# Patient Record
Sex: FEMALE | Race: WHITE | NOT HISPANIC OR LATINO
[De-identification: names, ages, dates, MRNs, and addresses within clinical notes are randomized per-mention and may not be internally consistent; named-entity substitution may affect disease eponyms.]

---

## 2020-05-22 ENCOUNTER — TRANSCRIPTION ENCOUNTER (OUTPATIENT)
Age: 80
End: 2020-05-22

## 2022-12-01 ENCOUNTER — OFFICE (OUTPATIENT)
Dept: URBAN - METROPOLITAN AREA CLINIC 8 | Facility: CLINIC | Age: 82
Setting detail: OPHTHALMOLOGY
End: 2022-12-01
Payer: MEDICARE

## 2022-12-01 DIAGNOSIS — H16.223: ICD-10-CM

## 2022-12-01 DIAGNOSIS — H47.291: ICD-10-CM

## 2022-12-01 DIAGNOSIS — H40.1131: ICD-10-CM

## 2022-12-01 DIAGNOSIS — H00.14: ICD-10-CM

## 2022-12-01 PROCEDURE — 99213 OFFICE O/P EST LOW 20 MIN: CPT | Performed by: OPHTHALMOLOGY

## 2022-12-01 PROCEDURE — 92133 CPTRZD OPH DX IMG PST SGM ON: CPT | Performed by: OPHTHALMOLOGY

## 2022-12-01 ASSESSMENT — PACHYMETRY
OS_CT_CORRECTION: -2
OD_CT_CORRECTION: -6
OS_CT_UM: 572
OD_CT_UM: 638

## 2022-12-01 ASSESSMENT — TONOMETRY
OS_IOP_MMHG: 14
OD_IOP_MMHG: 14

## 2022-12-01 ASSESSMENT — REFRACTION_AUTOREFRACTION
OS_SPHERE: +1.00
OS_AXIS: 118
OD_CYLINDER: -0.75
OD_AXIS: 028
OS_CYLINDER: -0.50
OD_SPHERE: +1.25

## 2022-12-01 ASSESSMENT — KERATOMETRY
METHOD_AUTO_MANUAL: AUTO
OD_AXISANGLE_DEGREES: 075
OS_K1POWER_DIOPTERS: 43.50
OS_K2POWER_DIOPTERS: 43.75
OD_K1POWER_DIOPTERS: 41.50
OD_K2POWER_DIOPTERS: 44.00
OS_AXISANGLE_DEGREES: 077

## 2022-12-01 ASSESSMENT — REFRACTION_MANIFEST
OS_AXIS: 120
OS_VA2: 20/50(J5)
OD_VA1: CF
OD_ADD: +2.75
OD_SPHERE: BALANCE
OS_VA1: 20/25
OD_ADD: +2.75
OD_VA1: CF
OS_VA1: 20/25
OS_VA2: 20/50(J5)
OS_CYLINDER: -0.25
OS_CYLINDER: -0.25
OS_SPHERE: +1.00
OS_ADD: +2.75
OD_VA2: 20/50(J5)
OS_SPHERE: +1.00
OS_ADD: +2.75
OD_VA2: 20/50(J5)
OS_AXIS: 120
OD_SPHERE: BALANCE

## 2022-12-01 ASSESSMENT — AXIALLENGTH_DERIVED
OD_AL: 23.5267
OS_AL: 23.2113
OS_AL: 23.2113
OS_AL: 23.2586

## 2022-12-01 ASSESSMENT — SPHEQUIV_DERIVED
OS_SPHEQUIV: 0.75
OS_SPHEQUIV: 0.875
OS_SPHEQUIV: 0.875
OD_SPHEQUIV: 0.875

## 2022-12-01 ASSESSMENT — SUPERFICIAL PUNCTATE KERATITIS (SPK)
OS_SPK: 3+
OD_SPK: 3+

## 2022-12-01 ASSESSMENT — CONFRONTATIONAL VISUAL FIELD TEST (CVF)
OD_FINDINGS: FULL
OS_FINDINGS: FULL

## 2022-12-01 ASSESSMENT — VISUAL ACUITY
OD_BCVA: 20/25-2
OS_BCVA: CF 1FT

## 2023-01-06 ENCOUNTER — OFFICE (OUTPATIENT)
Dept: URBAN - METROPOLITAN AREA CLINIC 8 | Facility: CLINIC | Age: 83
Setting detail: OPHTHALMOLOGY
End: 2023-01-06
Payer: MEDICARE

## 2023-01-06 DIAGNOSIS — H02.824: ICD-10-CM

## 2023-01-06 DIAGNOSIS — H16.223: ICD-10-CM

## 2023-01-06 DIAGNOSIS — H00.14: ICD-10-CM

## 2023-01-06 PROCEDURE — 99213 OFFICE O/P EST LOW 20 MIN: CPT | Performed by: OPHTHALMOLOGY

## 2023-01-06 ASSESSMENT — VISUAL ACUITY
OS_BCVA: CF 1FT
OD_BCVA: 20/25-2

## 2023-01-06 ASSESSMENT — TONOMETRY
OD_IOP_MMHG: 10
OS_IOP_MMHG: 12

## 2023-01-06 ASSESSMENT — REFRACTION_MANIFEST
OS_AXIS: 120
OS_VA1: 20/25
OD_ADD: +2.75
OS_VA2: 20/50(J5)
OS_SPHERE: +1.00
OD_SPHERE: BALANCE
OS_CYLINDER: -0.25
OS_CYLINDER: -0.25
OS_ADD: +2.75
OS_SPHERE: +1.00
OS_VA2: 20/50(J5)
OS_ADD: +2.75
OD_VA1: CF
OD_SPHERE: BALANCE
OS_AXIS: 120
OD_VA2: 20/50(J5)
OS_VA1: 20/25
OD_VA2: 20/50(J5)
OD_VA1: CF
OD_ADD: +2.75

## 2023-01-06 ASSESSMENT — REFRACTION_AUTOREFRACTION
OD_AXIS: 028
OD_SPHERE: +1.25
OD_CYLINDER: -0.75
OS_AXIS: 118
OS_CYLINDER: -0.50
OS_SPHERE: +1.00

## 2023-01-06 ASSESSMENT — PACHYMETRY
OD_CT_UM: 638
OS_CT_UM: 572
OS_CT_CORRECTION: -2
OD_CT_CORRECTION: -6

## 2023-01-06 ASSESSMENT — SPHEQUIV_DERIVED
OS_SPHEQUIV: 0.875
OS_SPHEQUIV: 0.75
OD_SPHEQUIV: 0.875
OS_SPHEQUIV: 0.875

## 2023-01-06 ASSESSMENT — AXIALLENGTH_DERIVED
OS_AL: 23.2586
OS_AL: 23.2113
OS_AL: 23.2113
OD_AL: 23.5267

## 2023-01-06 ASSESSMENT — CONFRONTATIONAL VISUAL FIELD TEST (CVF)
OS_FINDINGS: FULL
OD_FINDINGS: FULL

## 2023-01-06 ASSESSMENT — KERATOMETRY
OD_K1POWER_DIOPTERS: 41.50
METHOD_AUTO_MANUAL: AUTO
OD_AXISANGLE_DEGREES: 075
OS_AXISANGLE_DEGREES: 077
OS_K2POWER_DIOPTERS: 43.75
OD_K2POWER_DIOPTERS: 44.00
OS_K1POWER_DIOPTERS: 43.50

## 2023-01-06 ASSESSMENT — TEAR BREAK UP TIME (TBUT)
OS_TBUT: 2+
OD_TBUT: 2+

## 2023-01-06 ASSESSMENT — SUPERFICIAL PUNCTATE KERATITIS (SPK)
OS_SPK: 3+
OD_SPK: 3+

## 2023-02-01 PROBLEM — Z00.00 ENCOUNTER FOR PREVENTIVE HEALTH EXAMINATION: Status: ACTIVE | Noted: 2023-02-01

## 2023-02-02 ENCOUNTER — NON-APPOINTMENT (OUTPATIENT)
Age: 83
End: 2023-02-02

## 2023-02-02 ENCOUNTER — APPOINTMENT (OUTPATIENT)
Dept: CARDIOLOGY | Facility: CLINIC | Age: 83
End: 2023-02-02
Payer: MEDICARE

## 2023-02-02 VITALS
SYSTOLIC BLOOD PRESSURE: 120 MMHG | DIASTOLIC BLOOD PRESSURE: 62 MMHG | BODY MASS INDEX: 24.79 KG/M2 | WEIGHT: 147 LBS | HEART RATE: 66 BPM | HEIGHT: 64.5 IN | OXYGEN SATURATION: 96 % | TEMPERATURE: 98.2 F | RESPIRATION RATE: 16 BRPM

## 2023-02-02 DIAGNOSIS — M54.2 CERVICALGIA: ICD-10-CM

## 2023-02-02 DIAGNOSIS — Z78.9 OTHER SPECIFIED HEALTH STATUS: ICD-10-CM

## 2023-02-02 DIAGNOSIS — Z82.49 FAMILY HISTORY OF ISCHEMIC HEART DISEASE AND OTHER DISEASES OF THE CIRCULATORY SYSTEM: ICD-10-CM

## 2023-02-02 DIAGNOSIS — Z87.898 PERSONAL HISTORY OF OTHER SPECIFIED CONDITIONS: ICD-10-CM

## 2023-02-02 DIAGNOSIS — Z86.61 PERSONAL HISTORY OF INFECTIONS OF THE CENTRAL NERVOUS SYSTEM: ICD-10-CM

## 2023-02-02 DIAGNOSIS — Z80.3 FAMILY HISTORY OF MALIGNANT NEOPLASM OF BREAST: ICD-10-CM

## 2023-02-02 DIAGNOSIS — Z82.61 FAMILY HISTORY OF ARTHRITIS: ICD-10-CM

## 2023-02-02 DIAGNOSIS — Z87.891 PERSONAL HISTORY OF NICOTINE DEPENDENCE: ICD-10-CM

## 2023-02-02 DIAGNOSIS — Z80.0 FAMILY HISTORY OF MALIGNANT NEOPLASM OF DIGESTIVE ORGANS: ICD-10-CM

## 2023-02-02 DIAGNOSIS — Z92.21 PERSONAL HISTORY OF ANTINEOPLASTIC CHEMOTHERAPY: ICD-10-CM

## 2023-02-02 DIAGNOSIS — M51.9 UNSPECIFIED THORACIC, THORACOLUMBAR AND LUMBOSACRAL INTERVERTEBRAL DISC DISORDER: ICD-10-CM

## 2023-02-02 DIAGNOSIS — Z87.39 PERSONAL HISTORY OF OTHER DISEASES OF THE MUSCULOSKELETAL SYSTEM AND CONNECTIVE TISSUE: ICD-10-CM

## 2023-02-02 DIAGNOSIS — H54.61 UNQUALIFIED VISUAL LOSS, RIGHT EYE, NORMAL VISION LEFT EYE: ICD-10-CM

## 2023-02-02 DIAGNOSIS — Z82.3 FAMILY HISTORY OF STROKE: ICD-10-CM

## 2023-02-02 DIAGNOSIS — Z92.29 PERSONAL HISTORY OF OTHER DRUG THERAPY: ICD-10-CM

## 2023-02-02 PROCEDURE — 99203 OFFICE O/P NEW LOW 30 MIN: CPT

## 2023-02-02 RX ORDER — ASPIRIN 81 MG
81 TABLET, DELAYED RELEASE (ENTERIC COATED) ORAL
Refills: 0 | Status: ACTIVE | COMMUNITY

## 2023-02-02 RX ORDER — LATANOPROST/PF 0.005 %
0.01 DROPS OPHTHALMIC (EYE)
Refills: 0 | Status: ACTIVE | COMMUNITY
Start: 2023-02-02

## 2023-02-02 NOTE — DISCUSSION/SUMMARY
[FreeTextEntry1] : Remarkably healthy 82-year-old female with no previous history of cardiovascular events.  Fairly active for her age without any cardiac symptoms.  She has history of MVP many years ago.  Echocardiogram should be done to evaluate this.  On physical exam, I do not hear any midsystolic click\par \par Baseline EKG is unremarkable with sinus rhythm\par \par ETT should be performed in the future given her age and risk for CAD\par \par She claims that her lipid profile is fairly good.  Will obtain a copy of her last lipid profile from her internist in [place].\par \par Follow-up after above tests\par \par Thank you for this referral and allowing me to participate in the care of this patient.  If I can be of any further help or  if you have any questions, please do not hesitate to contact me\par \par \par Sincerely,\par \par Trey Zuñiga MD, FACC, JAYDEN

## 2023-02-02 NOTE — HISTORY OF PRESENT ILLNESS
[FreeTextEntry1] : Norbert is an 82-year-old female with remote history of MVP.  She has no symptoms of chest discomfort or shortness of breath.  No PND, orthopnea, pedal edema, palpitations or lightheadedness.\par \par Because of her history of MVP, she wanted a checkup.\par \par History of spinal surgery which she recovered from uneventfully.

## 2023-02-03 ENCOUNTER — APPOINTMENT (OUTPATIENT)
Dept: CARDIOLOGY | Facility: CLINIC | Age: 83
End: 2023-02-03
Payer: MEDICARE

## 2023-02-03 PROCEDURE — 93306 TTE W/DOPPLER COMPLETE: CPT

## 2023-02-15 ENCOUNTER — APPOINTMENT (OUTPATIENT)
Dept: CARDIOLOGY | Facility: CLINIC | Age: 83
End: 2023-02-15
Payer: MEDICARE

## 2023-02-15 PROCEDURE — 99214 OFFICE O/P EST MOD 30 MIN: CPT | Mod: 25

## 2023-02-15 PROCEDURE — 93015 CV STRESS TEST SUPVJ I&R: CPT

## 2023-02-15 NOTE — CARDIOLOGY SUMMARY
[de-identified] : \par EKG 2/2/2023 SR  [de-identified] : ETT 2/15/2023 [facility] protocol [de-identified] : \par Echo 2/3/2023 EF55-60%, mild MR

## 2023-02-15 NOTE — DISCUSSION/SUMMARY
[FreeTextEntry1] : ROSELINE ALVAREZ  is a 82 year F  who presents today Feb 15, 2023 with the above history and the following active issues.\par \par Baseline EKG is unremarkable with sinus rhythm\par \par Echo with normal resting cardiac structure and function.\par ETT with no significant EKG changes with exercise.\par Continue to remain active with cardiovascular exercise.\par CT calcium score for risk stratification - I will review the results over the phone with the patient.\par For now she will continue ASA and statin.\par Follow-up labs including fasting lipid panel.\par \par Mild mitral regurgitation. Continue to monitor\par Does not require SBE prophylaxis \par HR and blood pressure well controlled.\par \par Red flag symptoms which would warrant sooner emergent evaluation reviewed with the patient. \par Questions and concerns were addressed and answered. \par Limitations of non-invasive testing reviewed\par \par Sincerely,\par \par Saloni Holman PA-C\par Patients history, testing and plan reviewed with supervising MD: Dr. Tamia Hall

## 2023-02-15 NOTE — HISTORY OF PRESENT ILLNESS
[FreeTextEntry1] : ROSELINE ALVAREZ  is a 82 year F  who presents today Feb 15, 2023 in clinical follow-up and to review recent cardiovascular testing. \desiree Toussaint is an 82-year-old female with remote history of MVP. \par Overall she has been feeling well. There has been no recent illness or hospital stay. Medications have remained unchanged. Asymptomatic from cardiovascular and arrhythmia standpoint. \par She has remained very active with no new exertional complaints.\par Previously followed by cardiologist in [place].\par Taking ASA and Crestor for many years.\par \par Today she denies chest pain, pressure, unusual shortness of breath, lightheadedness, dizziness, near syncope or syncope. \par

## 2023-03-02 ENCOUNTER — OFFICE (OUTPATIENT)
Dept: URBAN - METROPOLITAN AREA CLINIC 8 | Facility: CLINIC | Age: 83
Setting detail: OPHTHALMOLOGY
End: 2023-03-02
Payer: MEDICARE

## 2023-03-02 DIAGNOSIS — H00.14: ICD-10-CM

## 2023-03-02 DIAGNOSIS — H16.223: ICD-10-CM

## 2023-03-02 PROCEDURE — 99213 OFFICE O/P EST LOW 20 MIN: CPT | Performed by: OPHTHALMOLOGY

## 2023-03-02 ASSESSMENT — REFRACTION_MANIFEST
OD_VA2: 20/50(J5)
OS_AXIS: 120
OD_ADD: +2.75
OS_VA2: 20/50(J5)
OS_VA1: 20/25
OD_SPHERE: BALANCE
OS_SPHERE: +1.00
OS_AXIS: 120
OD_SPHERE: BALANCE
OS_VA2: 20/50(J5)
OS_CYLINDER: -0.25
OD_VA2: 20/50(J5)
OS_ADD: +2.75
OS_SPHERE: +1.00
OS_VA1: 20/25
OS_ADD: +2.75
OD_ADD: +2.75
OD_VA1: CF
OS_CYLINDER: -0.25
OD_VA1: CF

## 2023-03-02 ASSESSMENT — VISUAL ACUITY
OD_BCVA: 20/25-2
OS_BCVA: CF 1FT

## 2023-03-02 ASSESSMENT — AXIALLENGTH_DERIVED
OS_AL: 23.2586
OD_AL: 23.5267
OS_AL: 23.2113
OS_AL: 23.2113

## 2023-03-02 ASSESSMENT — REFRACTION_AUTOREFRACTION
OS_SPHERE: +1.00
OD_AXIS: 028
OS_AXIS: 118
OD_CYLINDER: -0.75
OS_CYLINDER: -0.50
OD_SPHERE: +1.25

## 2023-03-02 ASSESSMENT — KERATOMETRY
OD_K2POWER_DIOPTERS: 44.00
OS_K1POWER_DIOPTERS: 43.50
OD_AXISANGLE_DEGREES: 075
OS_K2POWER_DIOPTERS: 43.75
METHOD_AUTO_MANUAL: AUTO
OS_AXISANGLE_DEGREES: 077
OD_K1POWER_DIOPTERS: 41.50

## 2023-03-02 ASSESSMENT — SPHEQUIV_DERIVED
OS_SPHEQUIV: 0.875
OS_SPHEQUIV: 0.75
OD_SPHEQUIV: 0.875
OS_SPHEQUIV: 0.875

## 2023-03-02 ASSESSMENT — CONFRONTATIONAL VISUAL FIELD TEST (CVF)
OS_FINDINGS: FULL
OD_FINDINGS: FULL

## 2023-03-02 ASSESSMENT — SUPERFICIAL PUNCTATE KERATITIS (SPK)
OS_SPK: 3+
OD_SPK: 3+

## 2023-03-02 ASSESSMENT — TEAR BREAK UP TIME (TBUT)
OS_TBUT: 2+
OD_TBUT: 2+

## 2023-03-24 ENCOUNTER — APPOINTMENT (OUTPATIENT)
Dept: CARDIOLOGY | Facility: CLINIC | Age: 83
End: 2023-03-24
Payer: MEDICARE

## 2023-03-24 VITALS
WEIGHT: 145 LBS | BODY MASS INDEX: 24.75 KG/M2 | HEIGHT: 64 IN | DIASTOLIC BLOOD PRESSURE: 80 MMHG | SYSTOLIC BLOOD PRESSURE: 124 MMHG | HEART RATE: 69 BPM | OXYGEN SATURATION: 96 %

## 2023-03-24 DIAGNOSIS — R73.03 PREDIABETES.: ICD-10-CM

## 2023-03-24 DIAGNOSIS — I34.0 NONRHEUMATIC MITRAL (VALVE) INSUFFICIENCY: ICD-10-CM

## 2023-03-24 PROCEDURE — 99214 OFFICE O/P EST MOD 30 MIN: CPT

## 2023-03-24 NOTE — HISTORY OF PRESENT ILLNESS
[FreeTextEntry1] : ROSELINE ALVAREZ  is a 82 year F  who presents today  in clinical follow-up and to review recent cardiovascular testing. \par \par Overall she has been feeling well. There has been no recent illness or hospital stay. Medications have remained unchanged. Asymptomatic from cardiovascular and arrhythmia standpoint. \par She has remained very active with no new exertional complaints.\par Previously followed by cardiologist in [place].\par Taking ASA and Crestor for many years.\par \par Today she denies chest pain, pressure, unusual shortness of breath, lightheadedness, dizziness, near syncope or syncope. \par \par Medical history includes\par Mitral valve prolapse/mitral regurgitation\par Osteoarthritis with back surgery

## 2023-03-24 NOTE — PHYSICAL EXAM
[Normal] : clear lung fields, good air entry, no respiratory distress [No Edema] : no edema [Normal Radial B/L] : normal radial B/L [Moves all extremities] : moves all extremities

## 2023-03-24 NOTE — ASSESSMENT
[FreeTextEntry1] : Reviewed on March 24, 2023.\par TTE/exercise treadmill stress test/labs were reviewed.\par Cardiac CT scan as noted above reviewed.\par CBC stable CMP stable hemoglobin A1c 6.0 LDL 90 HDL 85 triglycerides 101 total cholesterol 200 TSH 2.0

## 2023-03-24 NOTE — CARDIOLOGY SUMMARY
[de-identified] : \par EKG 2/2/2023 SR  [de-identified] : ETT 2/15/2023 [facility] protocol [de-identified] : \par Echo 2/3/2023 EF55-60%, mild MR [de-identified] : Coronary calcium score reviewed from February 23, 2023.  CCS score of 154.  Mainly involving left main.

## 2023-03-24 NOTE — DISCUSSION/SUMMARY
[FreeTextEntry1] : ROSELINE ALVAREZ  is a 82 year F  who presents today  with the above history and the following active issues.\par \par Calcific coronary atherosclerosis.  Nonischemic stress test.  No signs of unstable CAD.  Reviewed with her regarding pathophysiology of atherosclerosis.  Need for continue lifestyle and risk factor modifications.  Risk benefits alternatives of aspirin and statin therapy reviewed.  Probably will need higher dose of statins.  She would like to continue with the lifestyle and present dose of statins with repeat labs in about 6 months.  If LDL cholesterol remains greater than 60 consider increasing dose of statins.\par \par Echo with normal resting cardiac structure and function.\par ETT with no significant EKG changes with exercise.\par Continue to remain active with cardiovascular exercise.\par For now she will continue ASA and statin.\par Follow-up labs including fasting lipid panel.\par \par Mild mitral regurgitation. Continue to monitor\par Does not require SBE prophylaxis \par HR and blood pressure well controlled.\par \par Counseling regarding low saturated fat, salt and carbohydrate intake was reviewed. Active lifestyle and regular. Exercise along with weight management is advised.\par All the above were at length reviewed. Answered all the questions. Thank you very much for this kind referral. Please do not hesitate to give me a call for any question.\par Part of this transcription was done with voice recognition software and phonetically similar errors are common. I apologize for that. Please do not hesitate to call for any questions due to above.\par \par Sincerely,\par Jordon Schultz MD,FACC,JAYDEN\par

## 2023-04-21 ENCOUNTER — NON-APPOINTMENT (OUTPATIENT)
Age: 83
End: 2023-04-21

## 2023-04-26 ENCOUNTER — NON-APPOINTMENT (OUTPATIENT)
Age: 83
End: 2023-04-26

## 2023-08-03 ENCOUNTER — OFFICE (OUTPATIENT)
Dept: URBAN - METROPOLITAN AREA CLINIC 8 | Facility: CLINIC | Age: 83
Setting detail: OPHTHALMOLOGY
End: 2023-08-03
Payer: MEDICARE

## 2023-08-03 DIAGNOSIS — H26.491: ICD-10-CM

## 2023-08-03 DIAGNOSIS — H47.291: ICD-10-CM

## 2023-08-03 DIAGNOSIS — Z96.1: ICD-10-CM

## 2023-08-03 DIAGNOSIS — H16.223: ICD-10-CM

## 2023-08-03 DIAGNOSIS — H40.1131: ICD-10-CM

## 2023-08-03 PROCEDURE — 92083 EXTENDED VISUAL FIELD XM: CPT | Performed by: OPHTHALMOLOGY

## 2023-08-03 PROCEDURE — 92133 CPTRZD OPH DX IMG PST SGM ON: CPT | Performed by: OPHTHALMOLOGY

## 2023-08-03 PROCEDURE — 99213 OFFICE O/P EST LOW 20 MIN: CPT | Performed by: OPHTHALMOLOGY

## 2023-08-03 ASSESSMENT — REFRACTION_MANIFEST
OD_VA1: CF
OS_SPHERE: +1.00
OD_SPHERE: BALANCE
OD_ADD: +2.75
OS_SPHERE: +1.00
OD_VA1: CF
OS_CYLINDER: -0.25
OS_AXIS: 120
OS_VA1: 20/25
OS_VA1: 20/25
OD_VA2: 20/50(J5)
OS_AXIS: 120
OD_SPHERE: BALANCE
OS_VA2: 20/50(J5)
OS_ADD: +2.75
OD_VA2: 20/50(J5)
OD_ADD: +2.75
OS_CYLINDER: -0.25
OS_ADD: +2.75
OS_VA2: 20/50(J5)

## 2023-08-03 ASSESSMENT — REFRACTION_AUTOREFRACTION
OS_SPHERE: +1.00
OD_CYLINDER: -0.75
OD_SPHERE: +1.25
OD_AXIS: 028
OS_CYLINDER: -0.50
OS_AXIS: 118

## 2023-08-03 ASSESSMENT — SUPERFICIAL PUNCTATE KERATITIS (SPK)
OD_SPK: 3+
OS_SPK: 3+

## 2023-08-03 ASSESSMENT — AXIALLENGTH_DERIVED
OS_AL: 23.2113
OD_AL: 23.5267
OS_AL: 23.2586
OS_AL: 23.2113

## 2023-08-03 ASSESSMENT — KERATOMETRY
OD_K1POWER_DIOPTERS: 41.50
OD_K2POWER_DIOPTERS: 44.00
OD_AXISANGLE_DEGREES: 075
OS_K2POWER_DIOPTERS: 43.75
METHOD_AUTO_MANUAL: AUTO
OS_K1POWER_DIOPTERS: 43.50
OS_AXISANGLE_DEGREES: 077

## 2023-08-03 ASSESSMENT — TONOMETRY
OS_IOP_MMHG: 16
OD_IOP_MMHG: 15

## 2023-08-03 ASSESSMENT — PACHYMETRY
OS_CT_UM: 572
OS_CT_CORRECTION: -2
OD_CT_UM: 638
OD_CT_CORRECTION: -6

## 2023-08-03 ASSESSMENT — VISUAL ACUITY
OD_BCVA: 20/25
OS_BCVA: CF 1FT

## 2023-08-03 ASSESSMENT — SPHEQUIV_DERIVED
OS_SPHEQUIV: 0.875
OS_SPHEQUIV: 0.875
OD_SPHEQUIV: 0.875
OS_SPHEQUIV: 0.75

## 2023-08-03 ASSESSMENT — CONFRONTATIONAL VISUAL FIELD TEST (CVF)
OS_FINDINGS: FULL
OD_FINDINGS: FULL

## 2023-08-03 ASSESSMENT — TEAR BREAK UP TIME (TBUT)
OS_TBUT: 2+
OD_TBUT: 2+

## 2023-09-21 ENCOUNTER — APPOINTMENT (OUTPATIENT)
Dept: CARDIOLOGY | Facility: CLINIC | Age: 83
End: 2023-09-21
Payer: MEDICARE

## 2023-09-21 VITALS
HEIGHT: 64 IN | WEIGHT: 144 LBS | BODY MASS INDEX: 24.59 KG/M2 | SYSTOLIC BLOOD PRESSURE: 128 MMHG | HEART RATE: 65 BPM | OXYGEN SATURATION: 96 % | DIASTOLIC BLOOD PRESSURE: 62 MMHG

## 2023-09-21 DIAGNOSIS — I34.1 NONRHEUMATIC MITRAL (VALVE) PROLAPSE: ICD-10-CM

## 2023-09-21 PROCEDURE — 99214 OFFICE O/P EST MOD 30 MIN: CPT

## 2024-01-12 ENCOUNTER — APPOINTMENT (OUTPATIENT)
Dept: CARDIOLOGY | Facility: CLINIC | Age: 84
End: 2024-01-12
Payer: MEDICARE

## 2024-01-12 PROCEDURE — 93306 TTE W/DOPPLER COMPLETE: CPT

## 2024-01-12 PROCEDURE — 93356 MYOCRD STRAIN IMG SPCKL TRCK: CPT

## 2024-04-02 ENCOUNTER — APPOINTMENT (OUTPATIENT)
Dept: CARDIOLOGY | Facility: CLINIC | Age: 84
End: 2024-04-02
Payer: MEDICARE

## 2024-04-02 VITALS
OXYGEN SATURATION: 99 % | HEIGHT: 64 IN | HEART RATE: 61 BPM | BODY MASS INDEX: 24.75 KG/M2 | SYSTOLIC BLOOD PRESSURE: 120 MMHG | DIASTOLIC BLOOD PRESSURE: 64 MMHG | WEIGHT: 145 LBS

## 2024-04-02 DIAGNOSIS — I25.10 ATHEROSCLEROTIC HEART DISEASE OF NATIVE CORONARY ARTERY W/OUT ANGINA PECTORIS: ICD-10-CM

## 2024-04-02 DIAGNOSIS — Z91.89 OTHER SPECIFIED PERSONAL RISK FACTORS, NOT ELSEWHERE CLASSIFIED: ICD-10-CM

## 2024-04-02 DIAGNOSIS — I34.9 NONRHEUMATIC MITRAL VALVE DISORDER, UNSPECIFIED: ICD-10-CM

## 2024-04-02 DIAGNOSIS — E78.49 OTHER HYPERLIPIDEMIA: ICD-10-CM

## 2024-04-02 DIAGNOSIS — E78.5 HYPERLIPIDEMIA, UNSPECIFIED: ICD-10-CM

## 2024-04-02 PROCEDURE — 93356 MYOCRD STRAIN IMG SPCKL TRCK: CPT

## 2024-04-02 PROCEDURE — G2211 COMPLEX E/M VISIT ADD ON: CPT

## 2024-04-02 PROCEDURE — 93308 TTE F-UP OR LMTD: CPT

## 2024-04-02 PROCEDURE — 99214 OFFICE O/P EST MOD 30 MIN: CPT

## 2024-04-02 RX ORDER — ROSUVASTATIN CALCIUM 10 MG/1
10 TABLET, FILM COATED ORAL DAILY
Qty: 90 | Refills: 2 | Status: ACTIVE | COMMUNITY
Start: 1900-01-01 | End: 1900-01-01

## 2024-04-02 NOTE — DISCUSSION/SUMMARY
[FreeTextEntry1] : ROSELINE ALVAREZ  is a 83 year F  who presents today  with the above history and the following active issues.    At risk for cardiomyopathy in presence of immunotherapy, hormone replacement, recent radiation to left chest, status post left breast lumpectomy.  Echocardiogram stable after 3 months with EF as well as GLS.  No clinical signs of left or right heart failure.  BNP level ordered.  Follow-up as recommended by oncologist in 3 months   Calcific coronary atherosclerosis.  Nonischemic stress test.  No signs of unstable CAD.  Reviewed with her regarding pathophysiology of atherosclerosis.  Need for continue lifestyle and risk factor modifications.  Risk benefits alternatives of aspirin and statin therapy reviewed.  Continue rosuvastatin at 5 mg.   If LDL cholesterol remains greater than 70 consider increasing dose of statins as long as no contraindication  Echo with normal resting cardiac structure and function. ETT with no significant EKG changes with exercise. Continue to remain active with cardiovascular exercise. For now she will continue ASA and statin. Follow-up labs including fasting lipid panel.  aortic root dilation: stable. follow. no intervention needed.   Mild mitral regurgitation. Continue to monitor Does not require SBE prophylaxis  HR and blood pressure well controlled.  Counseling regarding low saturated fat, salt and carbohydrate intake was reviewed. Active lifestyle and regular. Exercise along with weight management is advised. All the above were at length reviewed. Answered all the questions. Thank you very much for this kind referral. Please do not hesitate to give me a call for any question. Part of this transcription was done with voice recognition software and phonetically similar errors are common. I apologize for that. Please do not hesitate to call for any questions due to above.  Sincerely, Jordon Schultz MD,FACC,JAYDEN

## 2024-04-02 NOTE — CARDIOLOGY SUMMARY
[de-identified] : Echo 2/3/2023 EF55-60%, mild MR January 2024.  EF 59%.  GLS -20 aortic root dilation April 2, 2024.  EF around 60%.  GLS -20.  Borderline aortic root dimension. [de-identified] : ETT 2/15/2023 [facility] protocol [de-identified] : \par  EKG 2/2/2023 SR  [de-identified] : Coronary calcium score reviewed from February 23, 2023.  CCS score of 154.  Mainly involving left main.

## 2024-04-02 NOTE — ASSESSMENT
[FreeTextEntry1] : Reviewed on March 24, 2023. TTE/exercise treadmill stress test/labs were reviewed. Cardiac CT scan as noted above reviewed. CBC stable CMP stable hemoglobin A1c 6.0 LDL 90 HDL 85 triglycerides 101 total cholesterol 200 TSH 2.0  Reviewed on September 21, 2023. Last cardiovascular tests at her request were again reviewed.  Reviewed on April 2, 2024. Echocardiogram and recent available labs were reviewed with her.

## 2024-04-02 NOTE — PHYSICAL EXAM
[Normal Speech] : normal speech [Alert and Oriented] : alert and oriented [de-identified] : Systolic ejection murmur

## 2024-04-02 NOTE — HISTORY OF PRESENT ILLNESS
[FreeTextEntry1] : ROSELINE ALVAREZ  is a 83 year F  who presents today  in clinical follow-up and to review recent cardiovascular testing.   She is diagnosed to have left breast cancer.  Status postlumpectomy.  S/p radiation.  Now on Herceptin and estrogen.  Needs surveillance LV ejection fraction evaluation in presence of at risk for cardiomyopathy on a 3 monthly basis as per oncology.  Since last seen she has no significant cardiovascular symptoms.  She is taking her Crestor only at 5 mg.  Today she denies chest pain, pressure, unusual shortness of breath, lightheadedness, dizziness, near syncope or syncope.   Medical history includes  Calcific coronary atherosclerotic vascular disease. dyslipidemia  Mitral valve prolapse/mitral regurgitation Osteoarthritis with back surgery

## 2024-05-22 ENCOUNTER — RX RENEWAL (OUTPATIENT)
Age: 84
End: 2024-05-22

## 2024-05-22 RX ORDER — ROSUVASTATIN CALCIUM 5 MG/1
5 TABLET, FILM COATED ORAL
Qty: 90 | Refills: 0 | Status: ACTIVE | COMMUNITY
Start: 2024-05-22 | End: 1900-01-01

## 2024-06-27 ENCOUNTER — APPOINTMENT (OUTPATIENT)
Dept: CARDIOLOGY | Facility: CLINIC | Age: 84
End: 2024-06-27
Payer: MEDICARE

## 2024-06-27 PROCEDURE — 93356 MYOCRD STRAIN IMG SPCKL TRCK: CPT

## 2024-06-27 PROCEDURE — 93321 DOPPLER ECHO F-UP/LMTD STD: CPT

## 2024-06-27 PROCEDURE — 93325 DOPPLER ECHO COLOR FLOW MAPG: CPT

## 2024-06-27 PROCEDURE — 93308 TTE F-UP OR LMTD: CPT

## 2024-07-08 ENCOUNTER — NON-APPOINTMENT (OUTPATIENT)
Age: 84
End: 2024-07-08

## 2024-10-04 ENCOUNTER — APPOINTMENT (OUTPATIENT)
Dept: CARDIOLOGY | Facility: CLINIC | Age: 84
End: 2024-10-04
Payer: MEDICARE

## 2024-10-04 PROCEDURE — 93308 TTE F-UP OR LMTD: CPT

## 2024-10-04 PROCEDURE — 76376 3D RENDER W/INTRP POSTPROCES: CPT

## 2024-10-04 PROCEDURE — 93356 MYOCRD STRAIN IMG SPCKL TRCK: CPT

## 2024-10-08 ENCOUNTER — NON-APPOINTMENT (OUTPATIENT)
Age: 84
End: 2024-10-08

## 2024-10-08 ENCOUNTER — APPOINTMENT (OUTPATIENT)
Dept: CARDIOLOGY | Facility: CLINIC | Age: 84
End: 2024-10-08
Payer: MEDICARE

## 2024-10-08 VITALS
OXYGEN SATURATION: 98 % | DIASTOLIC BLOOD PRESSURE: 72 MMHG | WEIGHT: 139 LBS | SYSTOLIC BLOOD PRESSURE: 104 MMHG | BODY MASS INDEX: 23.86 KG/M2 | HEART RATE: 66 BPM

## 2024-10-08 DIAGNOSIS — I34.9 NONRHEUMATIC MITRAL VALVE DISORDER, UNSPECIFIED: ICD-10-CM

## 2024-10-08 DIAGNOSIS — I25.10 ATHEROSCLEROTIC HEART DISEASE OF NATIVE CORONARY ARTERY W/OUT ANGINA PECTORIS: ICD-10-CM

## 2024-10-08 DIAGNOSIS — E78.5 HYPERLIPIDEMIA, UNSPECIFIED: ICD-10-CM

## 2024-10-08 DIAGNOSIS — I34.1 NONRHEUMATIC MITRAL (VALVE) PROLAPSE: ICD-10-CM

## 2024-10-08 DIAGNOSIS — Z91.89 OTHER SPECIFIED PERSONAL RISK FACTORS, NOT ELSEWHERE CLASSIFIED: ICD-10-CM

## 2024-10-08 PROCEDURE — 99214 OFFICE O/P EST MOD 30 MIN: CPT

## 2024-10-08 PROCEDURE — 93000 ELECTROCARDIOGRAM COMPLETE: CPT

## 2024-10-08 PROCEDURE — G2211 COMPLEX E/M VISIT ADD ON: CPT

## 2024-10-08 RX ORDER — LETROZOLE TABLETS 2.5 MG/1
2.5 TABLET, FILM COATED ORAL DAILY
Refills: 0 | Status: ACTIVE | COMMUNITY

## 2024-10-08 RX ORDER — AMOXICILLIN 500 MG/1
500 TABLET, FILM COATED ORAL
Refills: 0 | Status: ACTIVE | COMMUNITY

## 2025-01-02 ENCOUNTER — RX ONLY (RX ONLY)
Age: 85
End: 2025-01-02

## 2025-01-02 RX ORDER — ROSUVASTATIN 5 MG/1
TABLET, FILM COATED ORAL
Qty: 30 | Refills: 0 | Status: ACTIVE | COMMUNITY

## 2025-01-07 ENCOUNTER — APPOINTMENT (OUTPATIENT)
Dept: CARDIOLOGY | Facility: CLINIC | Age: 85
End: 2025-01-07

## 2025-01-15 ENCOUNTER — OFFICE (OUTPATIENT)
Dept: URBAN - METROPOLITAN AREA CLINIC 97 | Facility: CLINIC | Age: 85
Setting detail: OPHTHALMOLOGY
End: 2025-01-15
Payer: MEDICARE

## 2025-01-15 DIAGNOSIS — H40.1131: ICD-10-CM

## 2025-01-15 DIAGNOSIS — H47.291: ICD-10-CM

## 2025-01-15 DIAGNOSIS — H26.491: ICD-10-CM

## 2025-01-15 DIAGNOSIS — H16.223: ICD-10-CM

## 2025-01-15 DIAGNOSIS — H52.4: ICD-10-CM

## 2025-01-15 PROCEDURE — 92133 CPTRZD OPH DX IMG PST SGM ON: CPT | Performed by: OPTOMETRIST

## 2025-01-15 PROCEDURE — 92014 COMPRE OPH EXAM EST PT 1/>: CPT | Performed by: OPTOMETRIST

## 2025-01-15 PROCEDURE — 92015 DETERMINE REFRACTIVE STATE: CPT | Performed by: OPTOMETRIST

## 2025-01-15 ASSESSMENT — REFRACTION_MANIFEST
OS_SPHERE: +0.50
OD_VA1: 20/HM
OS_ADD: +2.75
OS_VA1: 20/25
OS_CYLINDER: +0.25
OS_SPHERE: +1.00
OD_ADD: +2.75
OD_VA1: CF
OS_VA2: 20/50(J5)
OD_VA2: 20/50(J5)
OD_ADD: +2.75
OD_SPHERE: BALANCE
OD_SPHERE: BALANCE
OS_AXIS: 035
OS_CYLINDER: -0.25
OS_ADD: +2.75
OS_VA2: 20/50(J5)
OD_VA2: 20/50(J5)
OS_AXIS: 120
OS_VA1: 20/30

## 2025-01-15 ASSESSMENT — REFRACTION_CURRENTRX
OS_CYLINDER: +0.25
OS_AXIS: 061
OD_SPHERE: +0.50
OS_ADD: +2.75
OD_AXIS: 132
OS_OVR_VA: 20/
OD_OVR_VA: 20/
OD_ADD: +2.75
OD_CYLINDER: +0.50
OS_SPHERE: +0.75

## 2025-01-15 ASSESSMENT — VISUAL ACUITY
OD_BCVA: 20/30
OS_BCVA: HM

## 2025-01-15 ASSESSMENT — TEAR BREAK UP TIME (TBUT)
OD_TBUT: T 1+
OS_TBUT: T 1+

## 2025-01-15 ASSESSMENT — PACHYMETRY
OS_CT_UM: 572
OD_CT_UM: 638
OD_CT_CORRECTION: -6
OS_CT_CORRECTION: -2

## 2025-01-15 ASSESSMENT — KERATOMETRY
METHOD_AUTO_MANUAL: AUTO
OS_K2POWER_DIOPTERS: 43.50
OD_AXISANGLE_DEGREES: 092
OS_K1POWER_DIOPTERS: 43.25
OD_K2POWER_DIOPTERS: 45.25
OD_K1POWER_DIOPTERS: 42.50
OS_AXISANGLE_DEGREES: 035

## 2025-01-15 ASSESSMENT — REFRACTION_AUTOREFRACTION
OS_AXIS: 037
OD_SPHERE: +0.50
OD_CYLINDER: +1.50
OS_SPHERE: +0.75
OD_AXIS: 103
OS_CYLINDER: +0.50

## 2025-01-15 ASSESSMENT — CONFRONTATIONAL VISUAL FIELD TEST (CVF)
OD_FINDINGS: FULL
OS_FINDINGS: FULL

## 2025-01-29 ENCOUNTER — NON-APPOINTMENT (OUTPATIENT)
Age: 85
End: 2025-01-29

## 2025-01-29 ENCOUNTER — APPOINTMENT (OUTPATIENT)
Dept: CARDIOLOGY | Facility: CLINIC | Age: 85
End: 2025-01-29
Payer: MEDICARE

## 2025-01-29 VITALS
WEIGHT: 141 LBS | HEART RATE: 67 BPM | HEIGHT: 64 IN | BODY MASS INDEX: 24.07 KG/M2 | SYSTOLIC BLOOD PRESSURE: 134 MMHG | OXYGEN SATURATION: 97 % | DIASTOLIC BLOOD PRESSURE: 76 MMHG

## 2025-01-29 DIAGNOSIS — E78.5 HYPERLIPIDEMIA, UNSPECIFIED: ICD-10-CM

## 2025-01-29 DIAGNOSIS — E78.49 OTHER HYPERLIPIDEMIA: ICD-10-CM

## 2025-01-29 DIAGNOSIS — Z91.89 OTHER SPECIFIED PERSONAL RISK FACTORS, NOT ELSEWHERE CLASSIFIED: ICD-10-CM

## 2025-01-29 DIAGNOSIS — I25.10 ATHEROSCLEROTIC HEART DISEASE OF NATIVE CORONARY ARTERY W/OUT ANGINA PECTORIS: ICD-10-CM

## 2025-01-29 PROCEDURE — 93356 MYOCRD STRAIN IMG SPCKL TRCK: CPT

## 2025-01-29 PROCEDURE — 93325 DOPPLER ECHO COLOR FLOW MAPG: CPT

## 2025-01-29 PROCEDURE — 93321 DOPPLER ECHO F-UP/LMTD STD: CPT

## 2025-01-29 PROCEDURE — 99214 OFFICE O/P EST MOD 30 MIN: CPT

## 2025-01-29 PROCEDURE — 93308 TTE F-UP OR LMTD: CPT

## 2025-07-29 ENCOUNTER — OFFICE (OUTPATIENT)
Dept: URBAN - METROPOLITAN AREA CLINIC 97 | Facility: CLINIC | Age: 85
Setting detail: OPHTHALMOLOGY
End: 2025-07-29
Payer: MEDICARE

## 2025-07-29 ENCOUNTER — APPOINTMENT (OUTPATIENT)
Dept: CARDIOLOGY | Facility: CLINIC | Age: 85
End: 2025-07-29
Payer: MEDICARE

## 2025-07-29 VITALS
SYSTOLIC BLOOD PRESSURE: 120 MMHG | WEIGHT: 136 LBS | BODY MASS INDEX: 23.22 KG/M2 | HEART RATE: 68 BPM | HEIGHT: 64 IN | DIASTOLIC BLOOD PRESSURE: 70 MMHG | OXYGEN SATURATION: 98 %

## 2025-07-29 DIAGNOSIS — H40.1131: ICD-10-CM

## 2025-07-29 DIAGNOSIS — H26.491: ICD-10-CM

## 2025-07-29 DIAGNOSIS — H52.4: ICD-10-CM

## 2025-07-29 DIAGNOSIS — H16.223: ICD-10-CM

## 2025-07-29 DIAGNOSIS — I34.0 NONRHEUMATIC MITRAL (VALVE) INSUFFICIENCY: ICD-10-CM

## 2025-07-29 DIAGNOSIS — I25.10 ATHEROSCLEROTIC HEART DISEASE OF NATIVE CORONARY ARTERY W/OUT ANGINA PECTORIS: ICD-10-CM

## 2025-07-29 DIAGNOSIS — E78.49 OTHER HYPERLIPIDEMIA: ICD-10-CM

## 2025-07-29 DIAGNOSIS — E78.5 HYPERLIPIDEMIA, UNSPECIFIED: ICD-10-CM

## 2025-07-29 DIAGNOSIS — H47.291: ICD-10-CM

## 2025-07-29 PROCEDURE — G2211 COMPLEX E/M VISIT ADD ON: CPT

## 2025-07-29 PROCEDURE — 99213 OFFICE O/P EST LOW 20 MIN: CPT | Performed by: OPTOMETRIST

## 2025-07-29 PROCEDURE — 99214 OFFICE O/P EST MOD 30 MIN: CPT

## 2025-07-29 PROCEDURE — 92015 DETERMINE REFRACTIVE STATE: CPT | Performed by: OPTOMETRIST

## 2025-07-29 PROCEDURE — 93000 ELECTROCARDIOGRAM COMPLETE: CPT

## 2025-07-29 ASSESSMENT — REFRACTION_CURRENTRX
OS_OVR_VA: 20/
OD_CYLINDER: +1.25
OD_OVR_VA: 20/
OD_OVR_VA: 20/
OS_OVR_VA: 20/
OD_SPHERE: +2.75
OD_SPHERE: +1.25
OD_AXIS: 117
OS_AXIS: 040
OS_SPHERE: +2.75
OS_SPHERE: +2.00
OS_CYLINDER: +1.00

## 2025-07-29 ASSESSMENT — REFRACTION_MANIFEST
OS_SPHERE: +0.50
OD_ADD: +2.75
OS_AXIS: 035
OD_VA1: HM
OD_SPHERE: BALANCE
OS_ADD: +2.75
OD_ADD: +2.75
OS_CYLINDER: +0.25
OS_AXIS: 020
OD_SPHERE: BALANCE
OS_ADD: +2.75
OS_VA2: 20/50(J5)
OS_CYLINDER: +0.50
OD_VA2: 20/50(J5)
OD_VA1: 20/HM
OS_VA1: 20/30
OS_SPHERE: +0.25
OS_VA1: 20/25

## 2025-07-29 ASSESSMENT — REFRACTION_AUTOREFRACTION
OD_AXIS: 092
OS_SPHERE: +0.50
OD_SPHERE: +0.50
OD_CYLINDER: +1.75
OS_AXIS: 022
OS_CYLINDER: +0.75

## 2025-07-29 ASSESSMENT — KERATOMETRY
OD_K2POWER_DIOPTERS: 44.75
OS_AXISANGLE_DEGREES: 012
METHOD_AUTO_MANUAL: AUTO
OD_AXISANGLE_DEGREES: 088
OS_K2POWER_DIOPTERS: 43.75
OD_K1POWER_DIOPTERS: 42.00
OS_K1POWER_DIOPTERS: 43.50

## 2025-07-29 ASSESSMENT — PACHYMETRY
OD_CT_UM: 638
OD_CT_CORRECTION: -6
OS_CT_CORRECTION: -2
OS_CT_UM: 572

## 2025-07-29 ASSESSMENT — CONFRONTATIONAL VISUAL FIELD TEST (CVF)
OS_FINDINGS: FULL
OD_FINDINGS: FULL

## 2025-07-29 ASSESSMENT — TEAR BREAK UP TIME (TBUT)
OD_TBUT: T 1+
OS_TBUT: T 1+

## 2025-07-29 ASSESSMENT — TONOMETRY
OS_IOP_MMHG: 13
OD_IOP_MMHG: 14

## 2025-07-29 ASSESSMENT — VISUAL ACUITY
OS_BCVA: HM
OD_BCVA: 20/25